# Patient Record
Sex: MALE | Race: BLACK OR AFRICAN AMERICAN | NOT HISPANIC OR LATINO | URBAN - METROPOLITAN AREA
[De-identification: names, ages, dates, MRNs, and addresses within clinical notes are randomized per-mention and may not be internally consistent; named-entity substitution may affect disease eponyms.]

---
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
## 2024-03-13 ENCOUNTER — HOSPITAL ENCOUNTER (EMERGENCY)
Facility: HOSPITAL | Age: 24
Discharge: HOME | End: 2024-03-13
Attending: EMERGENCY MEDICINE | Admitting: EMERGENCY MEDICINE
Payer: COMMERCIAL

## 2024-03-13 ENCOUNTER — APPOINTMENT (OUTPATIENT)
Dept: CT IMAGING | Facility: HOSPITAL | Age: 24
End: 2024-03-13
Payer: COMMERCIAL

## 2024-03-13 VITALS
HEART RATE: 52 BPM | WEIGHT: 180 LBS | OXYGEN SATURATION: 100 % | DIASTOLIC BLOOD PRESSURE: 80 MMHG | RESPIRATION RATE: 16 BRPM | SYSTOLIC BLOOD PRESSURE: 133 MMHG | TEMPERATURE: 98.1 F | BODY MASS INDEX: 25.77 KG/M2 | HEIGHT: 70 IN

## 2024-03-13 DIAGNOSIS — S05.92XD: ICD-10-CM

## 2024-03-13 DIAGNOSIS — S06.0X0A CONCUSSION WITHOUT LOSS OF CONSCIOUSNESS, INITIAL ENCOUNTER: Primary | ICD-10-CM

## 2024-03-13 DIAGNOSIS — H11.32 SUBCONJUNCTIVAL HEMORRHAGE OF LEFT EYE: ICD-10-CM

## 2024-03-13 DIAGNOSIS — R51.9 ACUTE NONINTRACTABLE HEADACHE, UNSPECIFIED HEADACHE TYPE: ICD-10-CM

## 2024-03-13 PROCEDURE — 70450 CT HEAD/BRAIN W/O DYE: CPT

## 2024-03-13 PROCEDURE — 99284 EMERGENCY DEPT VISIT MOD MDM: CPT

## 2024-03-13 PROCEDURE — 70480 CT ORBIT/EAR/FOSSA W/O DYE: CPT

## 2024-03-13 PROCEDURE — 25010000002 KETOROLAC TROMETHAMINE PER 15 MG

## 2024-03-13 PROCEDURE — 96372 THER/PROPH/DIAG INJ SC/IM: CPT

## 2024-03-13 RX ORDER — ACETAMINOPHEN 500 MG
1000 TABLET ORAL ONCE
Status: COMPLETED | OUTPATIENT
Start: 2024-03-13 | End: 2024-03-13

## 2024-03-13 RX ORDER — TRAMADOL HYDROCHLORIDE 50 MG/1
50 TABLET ORAL EVERY 6 HOURS PRN
Qty: 9 TABLET | Refills: 0 | Status: SHIPPED | OUTPATIENT
Start: 2024-03-13

## 2024-03-13 RX ORDER — IBUPROFEN 800 MG/1
800 TABLET ORAL
Qty: 30 TABLET | Refills: 0 | Status: SHIPPED | OUTPATIENT
Start: 2024-03-13

## 2024-03-13 RX ORDER — KETOROLAC TROMETHAMINE 30 MG/ML
30 INJECTION, SOLUTION INTRAMUSCULAR; INTRAVENOUS ONCE
Status: COMPLETED | OUTPATIENT
Start: 2024-03-13 | End: 2024-03-13

## 2024-03-13 RX ADMIN — ACETAMINOPHEN 1000 MG: 500 TABLET ORAL at 09:20

## 2024-03-13 RX ADMIN — KETOROLAC TROMETHAMINE 30 MG: 30 INJECTION, SOLUTION INTRAMUSCULAR; INTRAVENOUS at 11:15

## 2024-03-13 NOTE — FS[facility] PROVIDER NOTE
"Subjective   History of Present Illness  Patient is a 23-year-old male who presents to the emergency department for left sided headache and eye pain.  Patient was elbowed in both eyes while playing basketball [place] a [place] park x 3 days ago, left worse than right.  Was seen by optometrist the next day and diagnosed with subconjunctival hemorrhage.  Patient felt generally okay over the past 1 to 2 days, but woke up this morning with headache, photophobia, eye pain. Describes left-sided \"halo\" headache that wraps around.         Review of Systems   Constitutional:  Positive for fatigue. Negative for chills, diaphoresis and fever.   HENT:  Negative for ear discharge, ear pain, sinus pressure, sinus pain and sore throat.    Eyes:  Positive for photophobia, pain and redness. Negative for visual disturbance.   Respiratory:  Negative for cough and shortness of breath.    Cardiovascular:  Negative for chest pain.   Gastrointestinal:  Negative for abdominal pain, diarrhea, nausea and vomiting.   Musculoskeletal:  Negative for neck pain and neck stiffness.   Skin:  Negative for color change, pallor, rash and wound.   Neurological:  Positive for headaches. Negative for dizziness, seizures, syncope, weakness, light-headedness and numbness.   Psychiatric/Behavioral:  Negative for confusion.        History reviewed. No pertinent past medical history.    No Known Allergies    History reviewed. No pertinent surgical history.    History reviewed. No pertinent family history.    Social History     Socioeconomic History    Marital status: Single           Objective   Physical Exam  Constitutional:       General: He is not in acute distress.     Appearance: He is normal weight. He is not toxic-appearing.      Comments: Lying in dark room with sunglasses on. Fatigue. Sleeping ocassionally during checks.   HENT:      Head: Normocephalic and atraumatic.   Eyes:      Extraocular Movements: Extraocular movements intact.      Pupils: " Pupils are equal, round, and reactive to light.      Comments: Bilateral upper lid bruising, left worse than right.  Large left lateral subconjunctival hemorrhage.  Normal right conjunctiva.  Photophobia.   Cardiovascular:      Rate and Rhythm: Regular rhythm. Bradycardia present.   Pulmonary:      Effort: Pulmonary effort is normal. No respiratory distress.   Skin:     General: Skin is warm and dry.   Neurological:      General: No focal deficit present.      Mental Status: He is alert and oriented to person, place, and time.   Psychiatric:         Mood and Affect: Mood normal.         Behavior: Behavior normal.         Procedures           ED Course  ED Course as of 03/13/24 1246   Wed Mar 13, 2024   1135 FINDINGS:     HEAD CT: There is no evidence for a calvarial fracture or an acute  extra-axial hemorrhage.     The ventricles, sulci, and cisterns are age-appropriate. The gray-white  matter differentiation is within normal limits. The basal ganglia and  thalami are unremarkable. The posterior fossa structures are within  normal limits.     ORBIT CT: There is no evidence for acute fracture or bony malalignment  involving the orbits. The globes, extraocular muscles, optic nerves,  retrobulbar fat, and lacrimal glands all have an unremarkable  appearance.     Incidental note is made of partial opacification of the ethmoid air  cells presumably on the basis of secretions. There is a tiny mucous  retention cyst within the left maxillary sinus.     IMPRESSION:     No evidence for acute traumatic intracranial pathology.     No evidence for acute fracture or bony malalignment involving the  orbits.     Incidental note is made of minor changes of inflammatory paranasal sinus  disease.      [AS]      ED Course User Index  [AS] Camilla Cabello PA-C                                           Medical Decision Making  Patient is a 23-year-old male who presents to the emergency department for headache following eye trauma x 2 days  ago.  Has been seen by optometrist.    Patient is a healthy young male who appears fatigued lying in the dark.  Neurovascularly intact.    Exam reveals EOM intact.  PERRL.  Subconjunctival hemorrhage.  Photophobia.  Patient has been evaluated by eye specialist already.  No further indication to search for foreign body or other eye exam at this time.  CT orbits and CT head are negative.  Likely postconcussive symptoms with headache, fatigue, light sensitivity.  Work note provided.  Discussed concussion precautions and follow-up.  Discussed symptomatic treatment.  Ibuprofen and tramadol prescribed.  Discussed when to return to the emergency department.  Answered all questions.  Patient verbalized understanding and was agreeable to plan and discharge.    My differential diagnosis includes but is not limited to cerebral contusion, cervical strain, concussion with LOC, concussion without LOC, contusion, fracture of the skull, orbits or mandible, hematoma, intracranial hemorrhage including subdural, epidural, subarachnoid and intracerebral, laceration and postconcussion syndrome, conjunctivitis, corneal abrasion, corneal ulcer, foreign body, penetrating eye injury.       Amount and/or Complexity of Data Reviewed  Radiology: ordered.    Risk  OTC drugs.        Final diagnoses:   Concussion without loss of consciousness, initial encounter   Acute nonintractable headache, unspecified headache type   Eye injury, non-penetrating, left, subsequent encounter   Subconjunctival hemorrhage of left eye       ED Disposition  ED Disposition       ED Disposition   Discharge    Condition   Stable    Comment   --               Crista Arguello, APRN  [address]  [place] [address]  [phone]    Schedule an appointment as soon as possible for a visit            Medication List        New Prescriptions      ibuprofen 800 MG tablet  Commonly known as: ADVIL,MOTRIN  Take 1 tablet by mouth 3 (Three) Times a Day With Meals.      traMADol 50 MG tablet  Commonly known as: ULTRAM  Take 1 tablet by mouth Every 6 (Six) Hours As Needed for Moderate Pain or Severe Pain. May cause drowsiness.               Where to Get Your Medications        These medications were sent to [id number] - [place], [place] - [address] AT [facility] - [phone]  - [phone] FX  [address], [facility] [address]      Phone: [phone]   ibuprofen 800 MG tablet  traMADol 50 MG tablet

## 2024-03-13 NOTE — Clinical Note
Bed: 01  Expected date:   Expected time:   Means of arrival:   Comments:  2nd triage [facility]  [address]  [facility] [address]    Rik Dyer was seen and treated in our emergency department on 3/13/2024.  He may return to work on 03/18/2024.         Thank you for choosing [facility].    Camilla Cabello PA-C

## 2024-03-13 NOTE — DISCHARGE INSTRUCTIONS
Avoid activities in which a head injury could occur until cleared by primary care physician, such as strenuous exercises or sports.  Alternate acetaminophen 1000 mg and ibuprofen 800 mg every 4 hours.  Use tramadol as prescribed as needed.  This is a narcotic.  May cause dressings.  Do not drive while taking.  Increase electrolyte fluids and small bland meals.  Return to emergency department for worsening symptoms or other medical emergencies.  Refer to the attached instructions for further information.